# Patient Record
Sex: MALE | Race: WHITE | Employment: OTHER | URBAN - METROPOLITAN AREA
[De-identification: names, ages, dates, MRNs, and addresses within clinical notes are randomized per-mention and may not be internally consistent; named-entity substitution may affect disease eponyms.]

---

## 2020-02-12 ENCOUNTER — HOSPITAL ENCOUNTER (OUTPATIENT)
Facility: CLINIC | Age: 45
Setting detail: OBSERVATION
Discharge: HOME | End: 2020-02-13
Attending: EMERGENCY MEDICINE | Admitting: INTERNAL MEDICINE
Payer: COMMERCIAL

## 2020-02-12 ENCOUNTER — APPOINTMENT (OUTPATIENT)
Dept: GENERAL RADIOLOGY | Facility: CLINIC | Age: 45
End: 2020-02-12
Attending: EMERGENCY MEDICINE
Payer: COMMERCIAL

## 2020-02-12 ENCOUNTER — OFFICE VISIT (OUTPATIENT)
Dept: FAMILY MEDICINE | Facility: CLINIC | Age: 45
End: 2020-02-12
Payer: COMMERCIAL

## 2020-02-12 VITALS
DIASTOLIC BLOOD PRESSURE: 82 MMHG | OXYGEN SATURATION: 93 % | HEART RATE: 112 BPM | SYSTOLIC BLOOD PRESSURE: 116 MMHG | TEMPERATURE: 98.2 F | WEIGHT: 315 LBS

## 2020-02-12 DIAGNOSIS — R07.9 CHEST PAIN, UNSPECIFIED TYPE: Primary | ICD-10-CM

## 2020-02-12 DIAGNOSIS — E87.1 HYPONATREMIA: ICD-10-CM

## 2020-02-12 DIAGNOSIS — E87.20 LACTIC ACIDOSIS: ICD-10-CM

## 2020-02-12 DIAGNOSIS — F10.939 ALCOHOL WITHDRAWAL SYNDROME WITH COMPLICATION (H): ICD-10-CM

## 2020-02-12 DIAGNOSIS — R07.9 CHEST PAIN, UNSPECIFIED TYPE: ICD-10-CM

## 2020-02-12 LAB
ALBUMIN SERPL-MCNC: 4.1 G/DL (ref 3.4–5)
ALP SERPL-CCNC: 87 U/L (ref 40–150)
ALT SERPL W P-5'-P-CCNC: 145 U/L (ref 0–70)
ANION GAP SERPL CALCULATED.3IONS-SCNC: 17 MMOL/L (ref 3–14)
AST SERPL W P-5'-P-CCNC: 140 U/L (ref 0–45)
BASE DEFICIT BLDV-SCNC: 1.2 MMOL/L
BASOPHILS # BLD AUTO: 0 10E9/L (ref 0–0.2)
BASOPHILS NFR BLD AUTO: 0.3 %
BILIRUB DIRECT SERPL-MCNC: 0.4 MG/DL (ref 0–0.2)
BILIRUB SERPL-MCNC: 1.2 MG/DL (ref 0.2–1.3)
BUN SERPL-MCNC: 15 MG/DL (ref 7–30)
CALCIUM SERPL-MCNC: 9.6 MG/DL (ref 8.5–10.1)
CHLORIDE SERPL-SCNC: 93 MMOL/L (ref 94–109)
CO2 SERPL-SCNC: 19 MMOL/L (ref 20–32)
CREAT SERPL-MCNC: 1.02 MG/DL (ref 0.66–1.25)
D DIMER PPP FEU-MCNC: 0.5 UG/ML FEU (ref 0–0.5)
DIFFERENTIAL METHOD BLD: ABNORMAL
EOSINOPHIL # BLD AUTO: 0.1 10E9/L (ref 0–0.7)
EOSINOPHIL NFR BLD AUTO: 0.5 %
ERYTHROCYTE [DISTWIDTH] IN BLOOD BY AUTOMATED COUNT: 13.7 % (ref 10–15)
ETHANOL SERPL-MCNC: 0.03 G/DL
GFR SERPL CREATININE-BSD FRML MDRD: 88 ML/MIN/{1.73_M2}
GLUCOSE SERPL-MCNC: 107 MG/DL (ref 70–99)
HCO3 BLDV-SCNC: 23 MMOL/L (ref 21–28)
HCT VFR BLD AUTO: 38.4 % (ref 40–53)
HGB BLD-MCNC: 13.4 G/DL (ref 13.3–17.7)
IMM GRANULOCYTES # BLD: 0 10E9/L (ref 0–0.4)
IMM GRANULOCYTES NFR BLD: 0.1 %
INTERPRETATION ECG - MUSE: NORMAL
LACTATE BLD-SCNC: 1.6 MMOL/L (ref 0.7–2)
LACTATE BLD-SCNC: 3.4 MMOL/L (ref 0.7–2)
LIPASE SERPL-CCNC: 116 U/L (ref 73–393)
LYMPHOCYTES # BLD AUTO: 2.1 10E9/L (ref 0.8–5.3)
LYMPHOCYTES NFR BLD AUTO: 20.9 %
MAGNESIUM SERPL-MCNC: 1.7 MG/DL (ref 1.6–2.3)
MCH RBC QN AUTO: 34.4 PG (ref 26.5–33)
MCHC RBC AUTO-ENTMCNC: 34.9 G/DL (ref 31.5–36.5)
MCV RBC AUTO: 99 FL (ref 78–100)
MONOCYTES # BLD AUTO: 0.6 10E9/L (ref 0–1.3)
MONOCYTES NFR BLD AUTO: 5.9 %
NEUTROPHILS # BLD AUTO: 7.2 10E9/L (ref 1.6–8.3)
NEUTROPHILS NFR BLD AUTO: 72.3 %
NRBC # BLD AUTO: 0 10*3/UL
NRBC BLD AUTO-RTO: 0 /100
O2/TOTAL GAS SETTING VFR VENT: 21 %
OXYHGB MFR BLDV: 74 %
PCO2 BLDV: 34 MM HG (ref 40–50)
PH BLDV: 7.43 PH (ref 7.32–7.43)
PLATELET # BLD AUTO: 308 10E9/L (ref 150–450)
PO2 BLDV: 42 MM HG (ref 25–47)
POTASSIUM SERPL-SCNC: 3.1 MMOL/L (ref 3.4–5.3)
PROT SERPL-MCNC: 7.6 G/DL (ref 6.8–8.8)
RBC # BLD AUTO: 3.9 10E12/L (ref 4.4–5.9)
SODIUM SERPL-SCNC: 129 MMOL/L (ref 133–144)
TROPONIN I SERPL-MCNC: <0.015 UG/L (ref 0–0.04)
TROPONIN I SERPL-MCNC: <0.015 UG/L (ref 0–0.04)
WBC # BLD AUTO: 10.1 10E9/L (ref 4–11)

## 2020-02-12 PROCEDURE — 25800030 ZZH RX IP 258 OP 636: Performed by: EMERGENCY MEDICINE

## 2020-02-12 PROCEDURE — 99220 ZZC INITIAL OBSERVATION CARE,LEVL III: CPT | Performed by: INTERNAL MEDICINE

## 2020-02-12 PROCEDURE — 85025 COMPLETE CBC W/AUTO DIFF WBC: CPT | Performed by: EMERGENCY MEDICINE

## 2020-02-12 PROCEDURE — 93005 ELECTROCARDIOGRAM TRACING: CPT | Mod: 76

## 2020-02-12 PROCEDURE — 99285 EMERGENCY DEPT VISIT HI MDM: CPT | Mod: 25

## 2020-02-12 PROCEDURE — 25000132 ZZH RX MED GY IP 250 OP 250 PS 637: Performed by: INTERNAL MEDICINE

## 2020-02-12 PROCEDURE — 84484 ASSAY OF TROPONIN QUANT: CPT | Performed by: INTERNAL MEDICINE

## 2020-02-12 PROCEDURE — 99203 OFFICE O/P NEW LOW 30 MIN: CPT | Performed by: NURSE PRACTITIONER

## 2020-02-12 PROCEDURE — 93000 ELECTROCARDIOGRAM COMPLETE: CPT | Performed by: NURSE PRACTITIONER

## 2020-02-12 PROCEDURE — 80307 DRUG TEST PRSMV CHEM ANLYZR: CPT | Performed by: EMERGENCY MEDICINE

## 2020-02-12 PROCEDURE — 96374 THER/PROPH/DIAG INJ IV PUSH: CPT

## 2020-02-12 PROCEDURE — 84484 ASSAY OF TROPONIN QUANT: CPT | Performed by: EMERGENCY MEDICINE

## 2020-02-12 PROCEDURE — 85379 FIBRIN DEGRADATION QUANT: CPT | Performed by: EMERGENCY MEDICINE

## 2020-02-12 PROCEDURE — G0378 HOSPITAL OBSERVATION PER HR: HCPCS

## 2020-02-12 PROCEDURE — 80048 BASIC METABOLIC PNL TOTAL CA: CPT | Performed by: EMERGENCY MEDICINE

## 2020-02-12 PROCEDURE — 83690 ASSAY OF LIPASE: CPT | Performed by: EMERGENCY MEDICINE

## 2020-02-12 PROCEDURE — 96361 HYDRATE IV INFUSION ADD-ON: CPT

## 2020-02-12 PROCEDURE — 25000132 ZZH RX MED GY IP 250 OP 250 PS 637: Performed by: EMERGENCY MEDICINE

## 2020-02-12 PROCEDURE — 25000128 H RX IP 250 OP 636: Performed by: EMERGENCY MEDICINE

## 2020-02-12 PROCEDURE — 83735 ASSAY OF MAGNESIUM: CPT | Performed by: EMERGENCY MEDICINE

## 2020-02-12 PROCEDURE — 36415 COLL VENOUS BLD VENIPUNCTURE: CPT | Performed by: INTERNAL MEDICINE

## 2020-02-12 PROCEDURE — 83605 ASSAY OF LACTIC ACID: CPT | Performed by: INTERNAL MEDICINE

## 2020-02-12 PROCEDURE — 83605 ASSAY OF LACTIC ACID: CPT | Mod: 59 | Performed by: EMERGENCY MEDICINE

## 2020-02-12 PROCEDURE — 80076 HEPATIC FUNCTION PANEL: CPT | Performed by: EMERGENCY MEDICINE

## 2020-02-12 PROCEDURE — 25800030 ZZH RX IP 258 OP 636: Performed by: INTERNAL MEDICINE

## 2020-02-12 PROCEDURE — 93005 ELECTROCARDIOGRAM TRACING: CPT

## 2020-02-12 PROCEDURE — 80320 DRUG SCREEN QUANTALCOHOLS: CPT | Performed by: EMERGENCY MEDICINE

## 2020-02-12 PROCEDURE — 71046 X-RAY EXAM CHEST 2 VIEWS: CPT

## 2020-02-12 PROCEDURE — 82805 BLOOD GASES W/O2 SATURATION: CPT | Performed by: EMERGENCY MEDICINE

## 2020-02-12 RX ORDER — LOSARTAN POTASSIUM AND HYDROCHLOROTHIAZIDE 25; 100 MG/1; MG/1
1 TABLET ORAL DAILY
COMMUNITY

## 2020-02-12 RX ORDER — FLUOXETINE 20 MG/1
40 TABLET, FILM COATED ORAL DAILY
COMMUNITY

## 2020-02-12 RX ORDER — POTASSIUM CHLORIDE 29.8 MG/ML
20 INJECTION INTRAVENOUS
Status: DISCONTINUED | OUTPATIENT
Start: 2020-02-12 | End: 2020-02-13 | Stop reason: HOSPADM

## 2020-02-12 RX ORDER — SODIUM CHLORIDE 9 MG/ML
INJECTION, SOLUTION INTRAVENOUS CONTINUOUS
Status: DISCONTINUED | OUTPATIENT
Start: 2020-02-12 | End: 2020-02-13 | Stop reason: HOSPADM

## 2020-02-12 RX ORDER — ASPIRIN 81 MG/1
324 TABLET, CHEWABLE ORAL ONCE
Status: DISCONTINUED | OUTPATIENT
Start: 2020-02-12 | End: 2020-02-12

## 2020-02-12 RX ORDER — NITROGLYCERIN 0.4 MG/1
0.4 TABLET SUBLINGUAL EVERY 5 MIN PRN
Status: DISCONTINUED | OUTPATIENT
Start: 2020-02-12 | End: 2020-02-12

## 2020-02-12 RX ORDER — IPRATROPIUM BROMIDE AND ALBUTEROL SULFATE 2.5; .5 MG/3ML; MG/3ML
3 SOLUTION RESPIRATORY (INHALATION)
Status: DISCONTINUED | OUTPATIENT
Start: 2020-02-12 | End: 2020-02-13 | Stop reason: HOSPADM

## 2020-02-12 RX ORDER — HYDROCODONE BITARTRATE AND ACETAMINOPHEN 5; 325 MG/1; MG/1
1 TABLET ORAL EVERY 6 HOURS PRN
COMMUNITY

## 2020-02-12 RX ORDER — POTASSIUM CHLORIDE 1500 MG/1
20-40 TABLET, EXTENDED RELEASE ORAL
Status: DISCONTINUED | OUTPATIENT
Start: 2020-02-12 | End: 2020-02-13 | Stop reason: HOSPADM

## 2020-02-12 RX ORDER — AMLODIPINE BESYLATE 5 MG/1
5 TABLET ORAL DAILY
COMMUNITY

## 2020-02-12 RX ORDER — SODIUM CHLORIDE 9 MG/ML
1000 INJECTION, SOLUTION INTRAVENOUS CONTINUOUS
Status: DISCONTINUED | OUTPATIENT
Start: 2020-02-12 | End: 2020-02-12

## 2020-02-12 RX ORDER — ALBUTEROL SULFATE 0.83 MG/ML
3 SOLUTION RESPIRATORY (INHALATION)
Status: DISCONTINUED | OUTPATIENT
Start: 2020-02-12 | End: 2020-02-13 | Stop reason: HOSPADM

## 2020-02-12 RX ORDER — POTASSIUM CL/LIDO/0.9 % NACL 10MEQ/0.1L
10 INTRAVENOUS SOLUTION, PIGGYBACK (ML) INTRAVENOUS
Status: DISCONTINUED | OUTPATIENT
Start: 2020-02-12 | End: 2020-02-13 | Stop reason: HOSPADM

## 2020-02-12 RX ORDER — MAGNESIUM SULFATE HEPTAHYDRATE 40 MG/ML
4 INJECTION, SOLUTION INTRAVENOUS EVERY 4 HOURS PRN
Status: DISCONTINUED | OUTPATIENT
Start: 2020-02-12 | End: 2020-02-13 | Stop reason: HOSPADM

## 2020-02-12 RX ORDER — POTASSIUM CHLORIDE 1.5 G/1.58G
20-40 POWDER, FOR SOLUTION ORAL
Status: DISCONTINUED | OUTPATIENT
Start: 2020-02-12 | End: 2020-02-13 | Stop reason: HOSPADM

## 2020-02-12 RX ORDER — FOLIC ACID 1 MG/1
1 TABLET ORAL DAILY
Status: DISCONTINUED | OUTPATIENT
Start: 2020-02-12 | End: 2020-02-13 | Stop reason: HOSPADM

## 2020-02-12 RX ORDER — ACETAMINOPHEN 325 MG/1
650 TABLET ORAL EVERY 4 HOURS PRN
Status: DISCONTINUED | OUTPATIENT
Start: 2020-02-12 | End: 2020-02-13 | Stop reason: HOSPADM

## 2020-02-12 RX ORDER — LORAZEPAM 2 MG/ML
1-2 INJECTION INTRAMUSCULAR EVERY 30 MIN PRN
Status: DISCONTINUED | OUTPATIENT
Start: 2020-02-12 | End: 2020-02-13 | Stop reason: HOSPADM

## 2020-02-12 RX ORDER — LORAZEPAM 0.5 MG/1
1-2 TABLET ORAL EVERY 30 MIN PRN
Status: DISCONTINUED | OUTPATIENT
Start: 2020-02-12 | End: 2020-02-13 | Stop reason: HOSPADM

## 2020-02-12 RX ORDER — ACETAMINOPHEN 650 MG/1
650 SUPPOSITORY RECTAL EVERY 4 HOURS PRN
Status: DISCONTINUED | OUTPATIENT
Start: 2020-02-12 | End: 2020-02-13 | Stop reason: HOSPADM

## 2020-02-12 RX ORDER — LIDOCAINE 40 MG/G
CREAM TOPICAL
Status: DISCONTINUED | OUTPATIENT
Start: 2020-02-12 | End: 2020-02-13 | Stop reason: HOSPADM

## 2020-02-12 RX ORDER — MODAFINIL 200 MG/1
200 TABLET ORAL DAILY PRN
COMMUNITY

## 2020-02-12 RX ORDER — LANOLIN ALCOHOL/MO/W.PET/CERES
100 CREAM (GRAM) TOPICAL DAILY
Status: DISCONTINUED | OUTPATIENT
Start: 2020-02-12 | End: 2020-02-13 | Stop reason: HOSPADM

## 2020-02-12 RX ORDER — LORAZEPAM 2 MG/ML
1 INJECTION INTRAMUSCULAR ONCE
Status: COMPLETED | OUTPATIENT
Start: 2020-02-12 | End: 2020-02-12

## 2020-02-12 RX ORDER — MULTIPLE VITAMINS W/ MINERALS TAB 9MG-400MCG
1 TAB ORAL DAILY
Status: DISCONTINUED | OUTPATIENT
Start: 2020-02-12 | End: 2020-02-13 | Stop reason: HOSPADM

## 2020-02-12 RX ORDER — POTASSIUM CHLORIDE 7.45 MG/ML
10 INJECTION INTRAVENOUS
Status: DISCONTINUED | OUTPATIENT
Start: 2020-02-12 | End: 2020-02-13 | Stop reason: HOSPADM

## 2020-02-12 RX ADMIN — SODIUM CHLORIDE 1000 ML: 9 INJECTION, SOLUTION INTRAVENOUS at 18:16

## 2020-02-12 RX ADMIN — NITROGLYCERIN 0.4 MG: 0.4 TABLET SUBLINGUAL at 17:59

## 2020-02-12 RX ADMIN — SODIUM CHLORIDE 1000 ML: 9 INJECTION, SOLUTION INTRAVENOUS at 17:32

## 2020-02-12 RX ADMIN — THIAMINE HCL TAB 100 MG 100 MG: 100 TAB at 21:35

## 2020-02-12 RX ADMIN — POTASSIUM CHLORIDE 40 MEQ: 1500 TABLET, EXTENDED RELEASE ORAL at 22:01

## 2020-02-12 RX ADMIN — FOLIC ACID 1 MG: 1 TABLET ORAL at 21:35

## 2020-02-12 RX ADMIN — SODIUM CHLORIDE: 9 INJECTION, SOLUTION INTRAVENOUS at 21:08

## 2020-02-12 RX ADMIN — MULTIPLE VITAMINS W/ MINERALS TAB 1 TABLET: TAB at 21:35

## 2020-02-12 RX ADMIN — NITROGLYCERIN 0.4 MG: 0.4 TABLET SUBLINGUAL at 16:36

## 2020-02-12 RX ADMIN — LORAZEPAM 1 MG: 2 INJECTION INTRAMUSCULAR; INTRAVENOUS at 18:21

## 2020-02-12 RX ADMIN — ASPIRIN 324 MG: 81 TABLET, CHEWABLE ORAL at 16:30

## 2020-02-12 ASSESSMENT — ENCOUNTER SYMPTOMS
NUMBNESS: 1
MYALGIAS: 1
CONFUSION: 0
CHEST TIGHTNESS: 1
LIGHT-HEADEDNESS: 1
SPEECH DIFFICULTY: 0
VOMITING: 1
NAUSEA: 1
VOMITING: 1
SHORTNESS OF BREATH: 1
NAUSEA: 1
HEADACHES: 1
SHORTNESS OF BREATH: 1
DIAPHORESIS: 1

## 2020-02-12 ASSESSMENT — MIFFLIN-ST. JEOR: SCORE: 2481.11

## 2020-02-12 NOTE — ED PROVIDER NOTES
"  History     Chief Complaint:  Chest Pain and Lightheadedness     HPI   Patrick Cabrera is a 44 year old male with a history of hypertension, DVT, psoriasis, alcohol abuse and withdrawal, who presents with chest pain and lightheadedness. The patient reports that today he was at [employer] eating a restaurant and he notes that he had 1/3 of his burrito and a small amount of his gemini. The patient was about to stand up, roughly 1 hour and 20 minutes ago, when he had an onset of feeling lightheaded, clammy, and feeling \"off\". He describes that it felt like he had just taken Fentanyl or was coming out of anaesthesia. It had subsided after he sat down for a little but the sensation returned and he had an episode of emesis followed by becoming diaphoretic. The patient's wife notes that she knew something was off because he usually would not complain of symptoms. They then presented to a clinic near by when he started to develop chest pain 40 minuets ago after becoming lightheaded again. He describes the pain to be a tightness and restrictedness on his chest with shortness of breath. The patient rates the pain as a 6/10 current with tingling in his arms and legs that started after he was given a second nitroglycerin by EMS while being transported to the emergency department. The patient denies leg swelling or similar symptoms the past few days. He does not get regular exercise but did not have any symptoms with exertion over the past several days either. The patient recalls that he had a stress echo several years ago. He is unsure his he has high cholesterol. Lastly, the patient states that he drinks around 6-8 drinks a day and yesterday had a whole liter of vodka while ice fishing, but has not drank anything today besides the small amount of a gemini at the restaurant.     Cardiac/PE/DVT Risk Factors:  History of hypertension - Positive   History of hyperlipidemia - Negative   History of diabetes - Negative " "  History of smoking - Positive; former   Personal history of PE/DVT - Positive   Family history of PE/DVT - Negative   Family history of heart complications - Negative   Recent travel - Positive; drove from [place].   Recent surgery - Negative   Other immobilizations - Negative   Cancer - Negative      Allergies:  Erythromycin      Medications:    Norvasc  Fluoxetine   Hyzaar     Past Medical History:    Morbid obesity   Alcohol abuse   Gonarthrosis   Patellar instability of left knee   GERD  Neuropathic pain   Depression  Hypertension  DVT  Restless leg syndrome  Psoriasis   DJD   Alcohol withdrawal     Past Surgical History:    Tonsillectomy   Bone spur removal   Sinus surgery  Arthroplasty, knee replacement   Revision of total knee arthroplasty x2  Irrigation and debridement of wound   Closed reduction, dislocation   Osteotomy     Family History:    Family history reviewed. No pertinent family history.     Social History:  The patient was accompanied to the ED by EMS.  Smoking Status: Former Smoker  Smokeless Tobacco: Never Used  Alcohol Use: Positive  Drug Use: Negative  Marital Status:       Review of Systems   Constitutional: Positive for diaphoresis.        Tingling in arms and legs  Clammy  Feeling \"off\"   Respiratory: Positive for chest tightness and shortness of breath.    Cardiovascular: Positive for chest pain. Negative for leg swelling.   Gastrointestinal: Positive for nausea and vomiting.   All other systems reviewed and are negative.    Physical Exam     Patient Vitals for the past 24 hrs:   BP Temp Temp src Pulse Resp SpO2 Height Weight   02/12/20 2101 (!) 157/96 98.2  F (36.8  C) Oral 93 20 97 % -- --   02/12/20 1727 120/81 98.1  F (36.7  C) Oral 99 20 96 % 1.854 m (6' 1\") (!) 154.2 kg (340 lb)      Physical Exam    General:  Corpulent, anxious appearing middle aged gentleman. Hyperventilating but otherwise providing an appropriate history of today's events.     Eye:  Pupils are equal, round, " and reactive.  Extraocular movements intact.    ENT:  No rhinorrhea.  Moist mucus membranes.  Normal tongue and tonsil.    Cardiac: Mild tachycardia but regular. No murmurs, gallops, or rubs.     Pulmonary:  Clear to auscultation bilaterally.  No wheezes, rales, or rhonchi.    Abdomen:  Positive bowel sounds.  Abdomen is soft and non-distended, without focal tenderness.    Musculoskeletal:  Normal movement of all extremities without evidence for deficit. No lower extremity edema or asymmetry.     Skin:  Warm and dry without rashes. psoriatic changes without infection.     Neurologic:  Non-focal exam without asymmetric weakness or numbness. The patient appears mildly tremulous.    Psychiatric:  The patient is anxious.     Emergency Department Course     ECG:  ECG taken at 1730, ECG read at 1730  Sinus tachycardia   Nonspecific ST abnormality   Abnormal ECG  Rate 101 bpm. TX interval 150 ms. QRS duration 96 ms. QT/QTc 390/505 ms. P-R-T axes 71 44 21.    ECG #2:  ECG taken at 1752, ECG read at 1752  Normal sinus rhythm  Nonspecific ST abnormality   Prolonged QT  Abnormal ECG  Rate 94 bpm. TX interval 152 ms. QRS duration 100 ms. QT/QTc 402/502 ms. P-R-T axes 66 33 15.     ECG #3:  ECG taken at 1825, ECG read at 1830  Normal sinus rhythm  Nonspecific ST abnormality   Abnormal ECG  Rate 100 bpm. TX interval 150 ms. QRS duration 96 ms. QT/QTc 366/472 ms. P-R-T axes 66 43 13.     Imaging:  Radiology findings were communicated with the patient who voiced understanding of the findings.    XR Chest 2 Views   Final Result   IMPRESSION: No acute cardiopulmonary disease.      RODRIGO CHANDLER MD           Laboratory:  Laboratory findings were communicated with the patient who voiced understanding of the findings.    CBC: WBC 10.1, HGB 13.4,   BMP:  (L), Potassium 3.1 (L), Chloride 93 (L), Carbon Dioxide 19 (L), Anion Gap 17 (L), Glucose 107 (H), o/w WNL (Creatinine 1.02)    Troponin (Collected 1730): <0.015  D Dimer:  0.5      Lactic Acid (Resulted: 3.4    Hepatic Panel:  ,   Lipase: 116     Blood Gas 1814: pH 7.43, PCO2 34 (L), PO2 42, Bicarbonate 23, O2 Sat 21, Oxyhemoglobin 74, Base Deficit Venous 1.2        Interventions:  Medications:  1 mg lorazepam IV  2 L normal saline  Sublingual nitroglycerin x1    Emergency Department Course:    1728 Nursing notes and vitals reviewed. I performed an exam of the patient as documented above.     1730 EKG obtained as noted above. IV was inserted and blood was drawn for laboratory testing, results above.     1747 Patient rechecked and updated. The patient had been complaining of increasing chest pain and tingling in his hands.     1752 EKG obtained as noted above.     1806 Patient rechecked and updated.      1814 Blood drawn. This was sent to the lab for further testing, results above.     1823 Patient rechecked and updated. The patient appears more calm.      1825 EKG obtained as noted above.      Impression & Plan      Medical Decision Making:  Partick Cabrera is a 44 year old male who presents to the emergency department today for evaluation of lightheadedness and chest pain.  This patient had a's very concerned on his initial appearance.  He had initially gone to a local clinic where he was sent here with complains of having chest pain, shortness of breath, lightheadedness, and significant diaphoresis.  He was also complaining of tingling into bilateral arms and legs.  He appeared unwell on his initial presentation with tachycardia.  I obtained an EKG on arrival which was essentially unremarkable except for the tachycardia.  I follow this up with 2 further serial EKGs, none of which had any significant change.  His initial troponin returned undetectable.  With his recent travel from [place] and a prior history of DVT, a d-dimer was sent which is negative.  Chest x-ray was then obtained which is also normal.    I was surprised with his chemistry panel and talk to the patient  about the possibility of alcoholism or excessive beer consumption, causing him to be somewhat acidotic and hyponatremic.  He admits that he drinks every day, approximately 6-8 drinks and drink a full liter of vodka yesterday while he was ice fishing.  With this, LFTs were added on and they are mildly elevated though he shows no evidence of liver failure.  I added on a lactate and a VBG, with a lactate returned elevated but him not being acidotic by his blood gas.  This lactic acidosis improved after 2 L of fluid.  Also feeling that some of his symptoms may be related to alcohol withdrawal, he was given a milligram of Ativan which made a substantial improvement in his symptoms, taking away his tremulousness and his chest pain.    At this juncture, I have a rather overweight gentleman presenting to us with diaphoresis and chest pain that likely is also showing some signs of alcohol withdrawal.  I do not feel comfortable discharging him home and therefore I spoke with our admitting hospitalist who will admit him to our withdrawal unit for close observation and further treatment of symptoms.  He may require further cardiac work-up, at a minimum undergoing serial troponins.  However, I feel that he is significantly stabilized while in the emergency department I do not feel he requires further resuscitation at this time.  The patient is comfortable with the plan for admission and all questions were answered.    Diagnosis:    ICD-10-CM    1. Alcohol withdrawal syndrome with complication (H) F10.239 Magnesium     Magnesium     Troponin I     Lactic acid     CANCELED: UA with Microscopic     CANCELED: Magnesium   2. Chest pain, unspecified type R07.9    3. Lactic acidosis E87.2    4. Hyponatremia E87.1        Disposition:   Admission to medicine        Scribe Disclosure:  DAYAMI, Orla Severson, am serving as a scribe at 5:30 PM on 2/12/2020 to document services personally performed by Trierweiler, Chad A, MD based on my  observations and the provider's statements to me.    EMERGENCY DEPARTMENT       Trierweiler, Chad A, MD  02/12/20 4704

## 2020-02-12 NOTE — ED TRIAGE NOTES
Pt was at the [facility] and following having supper stood up and had episode of dizziness then developed chest tightness and nausea, was seen at urgent care and given 1nitroglycerin and asa- EMS transport to [facility] ED. Pt rated CP at 3/10 with decrease to 2/10. Additional 1 nitroglycerin administered. Upon arrival to ED pt reports pain increased to 8/10 and began diaphoretic.

## 2020-02-12 NOTE — PROGRESS NOTES
SUBJECTIVE:   Patrick Cabrera is a 44 year old male presenting with a chief complaint of   Chief Complaint   Patient presents with     Chest Pain       He is a new patient of [facility].    Cardiac Event    Symptom Onset: 1 hour(s) ago.  Timing of illness: sudden onset and worsening.  Current and Associated symptoms: chest pain, chest pressure/discomfort, dyspnea and fatigue.  Character: dull, pressure and tightness.  Severity moderately severe.  Location: left chest.  Radiation: left arm.  Associated Symptoms: SOB, diaphoresis, nausea, vomiting, lightheadedness and feeling faint.  Exacerbated by: exertion and deep inspiration or coughing.  Relieved by: nothing.  Cardiac risk factors: hypertension.  Patient states had not had any recent illness precipitating this event.     Patient states has history of hypertension - takes amlodipine and losartan-hydroclorothiazide. Does have history of DVT.   Denies history of heart disease or diabetes. Has never had anything like this happen in the past  Denies family history of heart disease or early MI.       Review of Systems   Eyes: Negative for visual disturbance.   Respiratory: Positive for shortness of breath.    Cardiovascular: Positive for chest pain.   Gastrointestinal: Positive for nausea and vomiting.   Musculoskeletal: Positive for myalgias.   Neurological: Positive for light-headedness, numbness (down left arm) and headaches. Negative for speech difficulty.   Psychiatric/Behavioral: Negative for confusion.       No past medical history on file.  No family history on file.  Current Outpatient Medications   Medication Sig Dispense Refill     amLODIPine (NORVASC) 5 MG tablet Take 5 mg by mouth daily       FLUoxetine 20 MG tablet Take 20 mg by mouth daily       HYDROcodone-acetaminophen (NORCO) 5-325 MG tablet Take 1 tablet by mouth every 6 hours as needed for severe pain       losartan-hydrochlorothiazide (HYZAAR) 100-25 MG tablet Take 1 tablet by mouth daily       Social  History     Tobacco Use     Smoking status: Not on file   Substance Use Topics     Alcohol use: Not on file       OBJECTIVE  /82 (BP Location: Right arm, Patient Position: Sitting, Cuff Size: Adult Large)   Pulse 112   Temp 98.2  F (36.8  C) (Oral)   Wt (!) 156 kg (344 lb)   SpO2 93%     Physical Exam  Vitals signs and nursing note reviewed.   Constitutional:       Appearance: He is ill-appearing and diaphoretic.   Cardiovascular:      Rate and Rhythm: Regular rhythm. Tachycardia present.      Heart sounds: Normal heart sounds.   Pulmonary:      Effort: Tachypnea present.      Breath sounds: Normal breath sounds and air entry.   Skin:     General: Skin is warm.      Capillary Refill: Capillary refill takes less than 2 seconds.   Neurological:      Mental Status: He is alert and oriented to person, place, and time.      GCS: GCS eye subscore is 4. GCS verbal subscore is 5. GCS motor subscore is 6.   Psychiatric:         Behavior: Behavior is cooperative.       EKG- Shows sinus tachycardia with ST depression.     ASSESSMENT:      ICD-10-CM    1. Chest pain, unspecified type R07.9 EKG 12-lead complete w/read - Clinics     nitroGLYcerin (NITROSTAT) sublingual tablet 0.4 mg     aspirin (ASA) chewable tablet 324 mg        Medical Decision Making:    Differential Diagnosis:  Cardiac: Acute MI  Chest wall Pain  Angina  PE  Atrial Fibrillation  Atrial Flutter  Supraventricular Tachycardia    Serious Comorbid Conditions:  Adult:  None    PLAN:  Patient presented with acute onset chest pain. Patient was diaphoretic and ill appearing. Vitals were taken BP was 116/82,  T 98.2, RR 22 O2 was 93 % on RA. Oxygen was started @ 10 liters via nasal canula. 4 baby ASA were given at 1630, nitroglycerin was given at 1636. EKG showed sinus tach with ST depression. Vitals post intervention was /79  O2 98. Patient did have improvement of symptoms with intervention. EMS arrived and took patient to [facility] ED.      Followup:    Transfer to ED via EMS    Patient Instructions   Patient Sent to [facility] Emergency Department VIA EMS.

## 2020-02-13 ENCOUNTER — APPOINTMENT (OUTPATIENT)
Dept: CARDIOLOGY | Facility: CLINIC | Age: 45
End: 2020-02-13
Attending: INTERNAL MEDICINE
Payer: COMMERCIAL

## 2020-02-13 VITALS
HEART RATE: 76 BPM | TEMPERATURE: 98 F | RESPIRATION RATE: 16 BRPM | BODY MASS INDEX: 41.75 KG/M2 | SYSTOLIC BLOOD PRESSURE: 140 MMHG | DIASTOLIC BLOOD PRESSURE: 88 MMHG | WEIGHT: 315 LBS | HEIGHT: 73 IN | OXYGEN SATURATION: 96 %

## 2020-02-13 LAB
POTASSIUM SERPL-SCNC: 3.6 MMOL/L (ref 3.4–5.3)
SODIUM SERPL-SCNC: 134 MMOL/L (ref 133–144)
TROPONIN I SERPL-MCNC: <0.015 UG/L (ref 0–0.04)
TROPONIN I SERPL-MCNC: <0.015 UG/L (ref 0–0.04)

## 2020-02-13 PROCEDURE — 94640 AIRWAY INHALATION TREATMENT: CPT

## 2020-02-13 PROCEDURE — 99217 ZZC OBSERVATION CARE DISCHARGE: CPT | Performed by: INTERNAL MEDICINE

## 2020-02-13 PROCEDURE — G0378 HOSPITAL OBSERVATION PER HR: HCPCS

## 2020-02-13 PROCEDURE — 36415 COLL VENOUS BLD VENIPUNCTURE: CPT | Performed by: INTERNAL MEDICINE

## 2020-02-13 PROCEDURE — 93018 CV STRESS TEST I&R ONLY: CPT | Performed by: INTERNAL MEDICINE

## 2020-02-13 PROCEDURE — 25800030 ZZH RX IP 258 OP 636: Performed by: INTERNAL MEDICINE

## 2020-02-13 PROCEDURE — 84295 ASSAY OF SERUM SODIUM: CPT | Performed by: INTERNAL MEDICINE

## 2020-02-13 PROCEDURE — 40000264 ECHO STRESS ECHOCARDIOGRAM

## 2020-02-13 PROCEDURE — 93016 CV STRESS TEST SUPVJ ONLY: CPT | Performed by: INTERNAL MEDICINE

## 2020-02-13 PROCEDURE — 25000132 ZZH RX MED GY IP 250 OP 250 PS 637: Performed by: INTERNAL MEDICINE

## 2020-02-13 PROCEDURE — 84132 ASSAY OF SERUM POTASSIUM: CPT | Performed by: INTERNAL MEDICINE

## 2020-02-13 PROCEDURE — 25500064 ZZH RX 255 OP 636: Performed by: INTERNAL MEDICINE

## 2020-02-13 PROCEDURE — 93325 DOPPLER ECHO COLOR FLOW MAPG: CPT | Mod: 26 | Performed by: INTERNAL MEDICINE

## 2020-02-13 PROCEDURE — 25000125 ZZHC RX 250: Performed by: INTERNAL MEDICINE

## 2020-02-13 PROCEDURE — 93321 DOPPLER ECHO F-UP/LMTD STD: CPT | Mod: 26 | Performed by: INTERNAL MEDICINE

## 2020-02-13 PROCEDURE — 84484 ASSAY OF TROPONIN QUANT: CPT | Mod: 91 | Performed by: INTERNAL MEDICINE

## 2020-02-13 PROCEDURE — 40000275 ZZH STATISTIC RCP TIME EA 10 MIN

## 2020-02-13 PROCEDURE — 93350 STRESS TTE ONLY: CPT | Mod: 26 | Performed by: INTERNAL MEDICINE

## 2020-02-13 RX ADMIN — THIAMINE HCL TAB 100 MG 100 MG: 100 TAB at 10:16

## 2020-02-13 RX ADMIN — POTASSIUM CHLORIDE 20 MEQ: 1500 TABLET, EXTENDED RELEASE ORAL at 00:08

## 2020-02-13 RX ADMIN — MULTIPLE VITAMINS W/ MINERALS TAB 1 TABLET: TAB at 10:17

## 2020-02-13 RX ADMIN — SODIUM CHLORIDE: 9 INJECTION, SOLUTION INTRAVENOUS at 07:00

## 2020-02-13 RX ADMIN — HUMAN ALBUMIN MICROSPHERES AND PERFLUTREN 9 ML: 10; .22 INJECTION, SOLUTION INTRAVENOUS at 08:50

## 2020-02-13 RX ADMIN — IPRATROPIUM BROMIDE AND ALBUTEROL SULFATE 3 ML: .5; 3 SOLUTION RESPIRATORY (INHALATION) at 11:31

## 2020-02-13 RX ADMIN — FOLIC ACID 1 MG: 1 TABLET ORAL at 10:17

## 2020-02-13 NOTE — PROGRESS NOTES
RECEIVING UNIT ED HANDOFF REVIEW    ED Nurse Handoff Report was reviewed by: Courtney Soto RN on February 12, 2020 at 8:28 PM

## 2020-02-13 NOTE — PLAN OF CARE
Transfer    S- Transfer to ed rom 635-1.    B-  chest pain     A- Brief systems assessment: A&Ox4    R- Transfer to 635-1 per physician orders. Continue to monitor pt and update physician as needed.     Code status:  full  Skin: scabs left arm  Fall Risk:no  Patient belongings: in room  Medication drips upon transfer: no  Bedside Report Letter Given and explained to pt: yes

## 2020-02-13 NOTE — PHARMACY-ADMISSION MEDICATION HISTORY
Pharmacy Medication History  Admission medication history interview status for the 2/12/2020  admission is complete. See EPIC admission navigator for prior to admission medications     Medication history sources: Patient  Medication history source reliability: Good  Adherence assessment: Good    Significant changes made to the medication list:        Additional medication history information:       Medication reconciliation completed by provider prior to medication history? No    Time spent in this activity: 10 min      Prior to Admission medications    Medication Sig Last Dose Taking? Auth Provider   amLODIPine (NORVASC) 5 MG tablet Take 5 mg by mouth daily 2/12/2020 at Unknown time Yes Reported, Patient   FLUoxetine 20 MG tablet Take 40 mg by mouth daily  2/12/2020 at Unknown time Yes Reported, Patient   HYDROcodone-acetaminophen (NORCO) 5-325 MG tablet Take 1 tablet by mouth every 6 hours as needed for severe pain 2/12/2020 at Unknown time Yes Reported, Patient   losartan-hydrochlorothiazide (HYZAAR) 100-25 MG tablet Take 1 tablet by mouth daily 2/12/2020 at Unknown time Yes Reported, Patient   modafinil (PROVIGIL) 200 MG tablet Take 200 mg by mouth daily as needed 2/12/2020 at Unknown time Yes Unknown, Entered By History

## 2020-02-13 NOTE — PLAN OF CARE
DATE & TIME: 2/12 from 1900 to 0730    Cognitive Concerns/ Orientation : A&O x 4   BEHAVIOR & AGGRESSION TOOL COLOR: Green  CIWA SCORE: 0 & 0  ABNL VS/O2: VSS on RA  MOBILITY: Up and independent  PAIN MANAGMENT: Denied  DIET: Clear liquid  BOWEL/BLADDER: Continent to bladder and bowel  ABNL LAB/BG: Na 3.1. Ca 99. , . Blirubin, K 3.1 replacement done. Recheck 3.6  DRAIN/DEVICE: PIV infusing 100 mL/hr  TELEMETRY RHYTHM: NSR  SKIN: Scab on R arm. Otherwise intact  TESTS/PROCEDURES: Stress Echo scheduled tomorrow  D/C DAY/GOALS/PLACE: Discharge spending in progress  OTHER IMPORTANT INFO: Pt denied chest pain.

## 2020-02-13 NOTE — H&P
Admitted:     02/12/2020      PRIMARY CARE PROVIDER:  Out of town.      CHIEF COMPLAINT:  Chest pain and lightheadedness.      HISTORY OF PRESENT ILLNESS:  Patrick Cabrera is a 44-year-old male from [place], [place] with history of hypertension, DVT, not on anticoagulation, psoriasis, daily alcohol use who presents to the emergency room with chest pain and lightheadedness.  The patient reportedly was at [employer] today and eating dinner at a restaurant when he all of the sudden got lightheaded.  Also felt clammy and felt off.  It apparently got better after he sat down; however, the sensation returned and subsequent to that he had 1 episode of emesis.  After that, he started having retrosternal chest pain which was 5-6/10 in intensity with tingling of his bilateral arms or legs.  He also felt a little dyspneic with that.  Given all these symptoms, he was brought to the emergency room.  On questioning further, he denies any fever or chills.  No cough.  No diarrhea. No dysuria, urinary urgency or frequency.  The patient mentioned that he drinks on a daily basis.  He was drinking until this morning.  Yesterday he drank about 1 bottle of vodka.  On average, he drinks about 6-8 drinks a day.  He denies having any problems with withdrawal in the past.  He is actually in [place] visiting for ice fishing.  In the emergency room, the patient was evaluated by Dr. Trierweiler.  Basic lab work showed low sodium of 129, potassium of 3.1, bicarbonate was 19, ALT of 145, AST of 140.  CBC was unremarkable.  Troponin was negative and D-dimer was 0.5.  Given his complaints of lightheadedness and chest pain, he is being admitted for further evaluation.      PAST MEDICAL HISTORY:   1.  Alcohol abuse.   2.  Psoriasis.   3.  Deep venous thrombosis.   4.  Compartment syndrome  while he was on anticoagulation.   5.  Hypertension.   6.  Restless legs syndrome.      SOCIAL AND PERSONAL HISTORY:  The patient is from [place], [place].   Denies tobacco use.  Alcohol history as mentioned above.      FAMILY HISTORY:  Reviewed and is noncontributory.      HOME MEDICATIONS:    Prior to Admission Medications   Prescriptions Last Dose Informant Patient Reported? Taking?   FLUoxetine 20 MG tablet 2/12/2020 at Unknown time  Yes Yes   Sig: Take 40 mg by mouth daily    HYDROcodone-acetaminophen (NORCO) 5-325 MG tablet 2/12/2020 at Unknown time  Yes Yes   Sig: Take 1 tablet by mouth every 6 hours as needed for severe pain   amLODIPine (NORVASC) 5 MG tablet 2/12/2020 at Unknown time  Yes Yes   Sig: Take 5 mg by mouth daily   losartan-hydrochlorothiazide (HYZAAR) 100-25 MG tablet 2/12/2020 at Unknown time  Yes Yes   Sig: Take 1 tablet by mouth daily   modafinil (PROVIGIL) 200 MG tablet 2/12/2020 at Unknown time  Yes Yes   Sig: Take 200 mg by mouth daily as needed      Facility-Administered Medications: None        REVIEW OF SYSTEMS:  A complete review of system was done and was negative for anything else other than that mentioned in the HPI.      PHYSICAL EXAMINATION:   GENERAL:  Mr. Patrick Cabrera is a 44-year-old male who is not in any obvious distress.  He is awake, alert, oriented x 3 and calm and cooperative at the time of my evaluation.   VITAL SIGNS:  Temperature 98.1, blood pressure 120/81, heart rate 99, respiratory rate 20, O2 sat 96% on room air.   HEENT:  Atraumatic, normocephalic, no pallor or icterus.   NECK:  Supple, with good range of motion.   RESPIRATORY:  Good air entry bilaterally with normal effort of breathing.   ABDOMEN:  Soft, nontender, nondistended, bowel sounds normoactive.   EXTREMITIES:  No edema, cyanosis or clubbing.   SKIN:  Warm and dry.   NEUROLOGIC:  He is awake, alert, oriented.  Cranial nerves II-XII are intact.      LABORATORY AND DIAGNOSTIC DATA:  Sodium is 129, potassium is 3.1, bicarbonate is 19, ALT and  and 140 respectively.  Bilirubin is normal.  Lactic acid was initially 3.4.  D-dimer was 0.5.  Alcohol level was  0.03.  X-ray of the chest does not show any acute process.      Twelve-lead EKG shows sinus tachycardia with nonspecific ST-T wave abnormality.  He had 3 EKGs in the emergency room, one of which had slightly elevated QT interval.      ASSESSMENT AND PLAN:  Mr. Kassy Castillo is a 44-year-old male originally from [place], [place] with history of hypertension, deep venous thrombosis, not on anticoagulation, psoriasis and alcohol abuse who presents to the emergency room with chest pain.   1.  Chest pain:  This happened after he had an episode of lightheadedness and dizziness and had 1 episode of emesis.  Denies any radiation of the pain.  He will be admitted to rule out acute coronary syndrome.  We will do serial troponins.  We will do a stress echo in the morning if he rules out acute coronary syndrome.   2.  At risk alcohol withdrawal:  He does not appear to be in overt withdrawal.  He does have a significant alcohol usage history.  I will put him on CIWA protocol.  We will replace his electrolytes and put him on oral.  Also replace his thiamine and multivitamin.   3.  Benign essential hypertension:  It appears like he is on amlodipine and a combination of losartan and hydrochlorothiazide.  Both will be continued once verified by the pharmacy.   4.  Hyponatremia:  His sodium is low today at 129.  This is most likely due to his alcohol usage and dehydration.  We will put him on some normal saline and repeat in the morning.   5.  Elevated liver function tests:  This again most likely is due to his ongoing alcohol usage.  Will recheck in the morning.      ANTICIPATED LENGTH OF STAY:  Less than 2 midnights.      CODE STATUS:  Full code.         JONO RENTERIA MD             D: 2020   T: 2020   MT:       Name:     KASSY CASTILLO   MRN:      [id number]        Account:      [id number]   :      1975        Admitted:     2020                   Document: [id number]

## 2020-02-13 NOTE — ED NOTES
"[facility]  ED Nurse Handoff Report    ED Chief complaint: Chest Pain      ED Diagnosis:   Final diagnoses:   Alcohol withdrawal syndrome with complication (H)   Chest pain, unspecified type   Lactic acidosis   Hyponatremia       Code Status: Full Code    Allergies:   Allergies   Allergen Reactions     Erythromycin        Patient Story: chest pain / etoh withdrawal  Focused Assessment:  Patrick Cabrera is a 44 year old male with a history of hypertension, DVT, psoriasis, alcohol abuse and withdrawal, who presents with chest pain and lightheadedness. The patient reports that today he was at [email or web address] eating a restaurant and he notes that he had 1/3 of his burrito and a small amount of his gemini. The patient was about to stand up, roughly 1 hour and 20 minutes ago, when he had an onset of feeling lightheaded, clammy, and feeling \"off\". He describes that it felt like he had just taken Fentanyl or was coming out of anaesthesia. It had subsided after he sat down for a little but the sensation returned and he had an episode of emesis followed by becoming diaphoretic. The patient's wife notes that she knew something was off because he usually would not complain of symptoms. They then presented to a clinic near by when he started to develop chest pain 40 minuets ago after becoming lightheaded again. He describes the pain to be a tightness and restrictedness on his chest with shortness of breath. The patient rates the pain as a 6/10 current with tingling in his arms and legs that started after he was given a second nitroglycerin by EMS while being transported to the emergency department. The patient denies leg swelling or similar symptoms the past few days. He does not get regular exercise but did not have any symptoms with exertion over the past several days either. The patient recalls that he had a stress echo several years ago. He is unsure his he has high cholesterol. Lastly, the patient states " "that he drinks around 6-8 drinks a day and yesterday had a whole liter of vodka while ice fishing, but has not drank anything today besides the small amount of a gemini at the restaurant.       Treatments and/or interventions provided: ativan, bolus  Patient's response to treatments and/or interventions: stable    To be done/followed up on inpatient unit:  monitor    Does this patient have any cognitive concerns?: none    Activity level - Baseline/Home:  Independent  Activity Level - Current:   Stand with Assist    Patient's Preferred language: English   Needed?: No    Isolation: None  Infection: Not Applicable  Bariatric?: No    Vital Signs:   Vitals:    02/12/20 1727   BP: 120/81   Pulse: 99   Resp: 20   Temp: 98.1  F (36.7  C)   TempSrc: Oral   SpO2: 96%   Weight: (!) 154.2 kg (340 lb)   Height: 1.854 m (6' 1\")       Cardiac Rhythm:Cardiac Rhythm: Sinus tachycardia    Was the PSS-3 completed:   Yes  What interventions are required if any?               Family Comments: spouse  OBS brochure/video discussed/provided to patient/family: N/A              Name of person given brochure if not patient: x              Relationship to patient: x    For the majority of the shift this patient's behavior was Green.   Behavioral interventions performed were x.    ED NURSE PHONE NUMBER: [id number]         "

## 2020-02-13 NOTE — PLAN OF CARE
DATE & TIME: 2/12/20   Cognitive Concerns/ Orientation : A&Ox4  BEHAVIOR & AGGRESSION TOOL COLOR: green  CIWA SCORE: 0   ABNL VS/O2:  WNL  MOBILITY: IND  PAIN MANAGMENT: denies  DIET: clear  BOWEL/BLADDER:  Void BR  ABNL LAB/BG: K 3.1  DRAIN/DEVICES: NA  TELEMETRY RHYTHM: NSR  SKIN:  scabs right arm  TESTS/PROCEDURES: Stress Echo tomorrow  D/C DAY/GOALS/PLACE: unknown  OTHER IMPORTANT INFO:  tropins negative

## 2020-02-13 NOTE — DISCHARGE SUMMARY
[facility]  Hospitalist Discharge Summary       Date of Admission:  2/12/2020  Date of Discharge:  2/13/2020  Discharging Provider: Na Montano MD      Discharge Diagnoses   Chest pain, most likely secondary to alcoholic gastritis.  Lightheadedness, most likely secondary to dehydration, resolved  Alcohol abuse.  Psoriasis.  Essential hypertension.  History of restless leg syndrome.    Follow-ups Needed After Discharge   Follow-up Appointments     Follow-up and recommended labs and tests      Follow up with primary care provider, No primary care provider on file.,   within 7 days to evaluate treatment change and for hospital follow- up.    No follow up labs or test are needed.         Patient will benefit from outpatient EGD, he has been advised to take over-the-counter PPI    Unresulted Labs Ordered in the Past 30 Days of this Admission     No orders found for last 31 day(s).          Discharge Disposition   Discharged to home  Condition at discharge: Stable    Hospital Course   Patient is a 44-year-old male, originally from [place], [place] with past medical history significant for essential hypertension, DVT not on anticoagulation, psoriasis and alcohol abuse who presented to the ER with episode of chest pain associated with lightheadedness and dizziness with one episode of emesis.  He was admitted to rule out acute coronary syndrome.  His serial troponins stayed negative and he also underwent a stress echocardiogram this morning.  A stress echo showed EKG being negative for ischemia at rest and with a stress.  There was no evidence of stress-induced wall motion abnormalities.  Normal resting wall motion and ejection fraction seem to be around more than 70%.    According to the history, patient has been drinking on daily basis.  A day before admission he drank about 1 bottle of vodka.  On average he has been drinking 6-8 drinks per day.  He denied having any issues with withdrawal in the past.  He  was actually visiting [place] for ice fishing.  I discussed with him regarding alcohol-related gastritis and probably alcohol also contributing to dehydration as patient also has mild hyponatremia on admission with sodium of 129.  Patient was started on IV fluids after that admission.  His LFTs were also slightly elevated with ALT of 145, AST of 140 and bilirubin of 0.4.  Patient responded well to the fluid and his sodium was improved to 134 this morning.  And his potassium was also improved to 3.6.  I discussed with him regarding continuing proton pump inhibitor after discharge, patient would like to buy it over-the-counter and at this time does not want any prescription.    Patient was seen and examined on the day of discharge , he is feeling well, does not have any complaints , I did review the discharge medications and instructions with the patient and plan for him to follow up with the PCP after the hospitalization .patient was in agreement , he is discharged in stable condition back to his home.    Consultations This Hospital Stay   None    Code Status   Full Code    Time Spent on this Encounter   I, Na Montano MD, personally saw the patient today and spent less than or equal to 30 minutes discharging this patient.     Na Montano MD  [facility]  ______________________________________________________________________    Physical Exam   Vital Signs: Temp: 98  F (36.7  C) Temp src: Oral BP: (!) 140/88 Pulse: 76   Resp: 16 SpO2: 96 % O2 Device: None (Room air)    Weight: 340 lbs 0 oz    Patient was seen and examined on the day of discharge , he is feeling well, does not have any complaints , I did review the discharge medications and instructions with the patient and plan for him to follow up with the PCP after the hospitalization .patient was in agreement , he is discharged in stable condition back to his home.         Primary Care Physician   No primary care provider on file.    Discharge  Orders      Reason for your hospital stay    You were admitted to the hospital secondary to chest pain , which might most Likely sec to alcoholic gastritis     Follow-up and recommended labs and tests    Follow up with primary care provider, No primary care provider on file., within 7 days to evaluate treatment change and for hospital follow- up.  No follow up labs or test are needed.     Activity    Your activity upon discharge: activity as tolerated and no driving for today     Discharge Instructions    Please take over the counter Prilosec after the discharge , and also keep your self hydrated after the discharge and please cutdown on alcohol consumption, also discuss with your PCP if you can be taken off hydrochlorothiazide due to risk of low sodium if your sodium is still low when you follow up with them , please have follow up with PCP in a week and get BMP ( blood work ) repeated to have follow up on your electrolytes.     Full Code     Diet    Follow this diet upon discharge: Orders Placed This Encounter      Combination Diet Clear Liquid; No Caffeine Diet         Significant Results and Procedures   Results for orders placed or performed during the hospital encounter of 20   XR Chest 2 Views    Narrative    CHEST TWO VIEWS   2020 6:49 PM     HISTORY: Chest pain    COMPARISON: None.      Impression    IMPRESSION: No acute cardiopulmonary disease.    RODRIGO CHANDLER MD   Echo Stress Echocardiogram    Narrative    [id number]  [id number]  [id number]  [id number]^DEXTER^JONO           [facility]  Echocardiography Laboratory  [address] [place] [address]        Name: KASSY CASTILLO  MRN: [id number]  : 1975  Study Date: 2020 08:19 AM  Age: 44 yrs  Gender: Male  Patient Location: [facility]  Reason For Study: Chest Pain  Ordering Physician: JONO RENTERIA  Referring Physician: No PCP  Performed By: Rehan Neil RDCS     BSA: 2.7 m2  Height: 73 in  Weight: 340 lb  HR:  97  BP: 136/89 mmHg  _____________________________________________________________________________  __        Procedure  Contrast [employer]. Stress Echo Complete.  _____________________________________________________________________________  __        Interpretation Summary  Suboptimal study due to poor acoustic windows. However at a target HR achieved  there are no apparent stress induced wall motion abnormalities. EKG negative  for ischemia at rest and with stress. Functional capacity below average.  Normal BP response to exercise. No exercise induced chest discomfort.  _____________________________________________________________________________  __     Stress  The patient exercised 7:29 min.  A low to moderate workload was achieved.  RPP [id number].  Exercise was stopped due to leg pain.  The patient exhibited no chest pain during exercise.  There was a normal BP response to exercise.  Target Heart Rate was achieved.  A treadmill exercise test according to a customized protocol was performed.  Occasional premature ventricular contractions.  The EKG portion of this stress test was negative for inducible ischemia (see  echo results below).  Left ventricular cavity size decreases with exercise.  Global LV systolic function augments with exercise.  Normal resting wall motion and no stress-induced wall motion abnormality.  The visual ejection fraction is estimated at >70%.     Baseline  The patient is in normal sinus rhythm.  Resting ECG is normal.  Normal left ventricular function and wall motion at rest.  The visual ejection fraction is estimated at 60-65%.     Stress Results         Protocol:  Custom Protocol        Maximum Predicted HR:   175 bpm         Target HR: 149 bpm                % Maximum Predicted HR: 91 %                    Duration  Heart       Stage      (mm:ss)  Rate    BP                   Comment                           (bpm)    1.7 mph, 0%    3:00     114  158/86      incline    1.7 mph, 10%   3:00      136  166/86      incline    2.5 mph, 12%   1:29     160     /  FAC; below average, Duke score: Cannot      incline                          calculate due to protocol     RecoveryR     6:10     94   139/80                Stress Duration:   7:29 mm:ss        Recovery Time: 6:10 mm:ss          Maximum Stress HR: 160 bpm           METS:          7     Left Ventricle  The left ventricle is normal in structure, function and size.        Right Ventricle  The right ventricle is normal in structure, function and size.     Atria  Normal left atrial size. Right atrial size is normal.     Mitral Valve  The mitral valve is normal in structure and function.     Tricuspid Valve  The tricuspid valve is normal in structure and function.     Aortic Valve  The aortic valve is normal in structure and function.        Pulmonic Valve  The pulmonic valve is normal in structure and function.     Vessels  The aortic root is normal size.     Pericardium  There is no pericardial effusion.  _____________________________________________________________________________  __           Doppler Measurements & Calculations  Ao V2 max: 187.0 cm/sec  Ao max P.0 mmHg  Ao V2 mean: 121.0 cm/sec  Ao mean P.0 mmHg  Ao V2 VTI: 31.2 cm           _____________________________________________________________________________  __           Report approved by: Jin Phillips 2020 09:50 AM            Discharge Medications   Current Discharge Medication List      CONTINUE these medications which have NOT CHANGED    Details   amLODIPine (NORVASC) 5 MG tablet Take 5 mg by mouth daily      FLUoxetine 20 MG tablet Take 40 mg by mouth daily       HYDROcodone-acetaminophen (NORCO) 5-325 MG tablet Take 1 tablet by mouth every 6 hours as needed for severe pain      losartan-hydrochlorothiazide (HYZAAR) 100-25 MG tablet Take 1 tablet by mouth daily      modafinil (PROVIGIL) 200 MG tablet Take 200 mg by mouth daily as needed           Allergies    Allergies   Allergen Reactions     Erythromycin